# Patient Record
Sex: FEMALE | Race: WHITE | NOT HISPANIC OR LATINO | Employment: FULL TIME | URBAN - METROPOLITAN AREA
[De-identification: names, ages, dates, MRNs, and addresses within clinical notes are randomized per-mention and may not be internally consistent; named-entity substitution may affect disease eponyms.]

---

## 2023-10-06 PROCEDURE — 1100000001 HC PRIVATE ROOM DAILY

## 2023-10-07 PROCEDURE — 1100000001 HC PRIVATE ROOM DAILY

## 2023-10-08 PROCEDURE — 1100000001 HC PRIVATE ROOM DAILY

## 2023-10-09 ENCOUNTER — HOSPITAL ENCOUNTER (INPATIENT)
Dept: NEUROLOGY | Facility: HOSPITAL | Age: 28
LOS: 5 days | Discharge: HOME | DRG: 101 | End: 2023-10-11
Attending: STUDENT IN AN ORGANIZED HEALTH CARE EDUCATION/TRAINING PROGRAM | Admitting: STUDENT IN AN ORGANIZED HEALTH CARE EDUCATION/TRAINING PROGRAM
Payer: COMMERCIAL

## 2023-10-09 DIAGNOSIS — R56.9 SEIZURE (MULTI): ICD-10-CM

## 2023-10-09 DIAGNOSIS — R56.9 CONVULSIONS, UNSPECIFIED CONVULSION TYPE (MULTI): Primary | ICD-10-CM

## 2023-10-09 PROCEDURE — 95700 EEG CONT REC W/VID EEG TECH: CPT

## 2023-10-09 PROCEDURE — 93010 ELECTROCARDIOGRAM REPORT: CPT | Performed by: INTERNAL MEDICINE

## 2023-10-09 PROCEDURE — 95716 VEEG EA 12-26HR CONT MNTR: CPT

## 2023-10-09 PROCEDURE — 1100000001 HC PRIVATE ROOM DAILY

## 2023-10-09 PROCEDURE — 95720 EEG PHY/QHP EA INCR W/VEEG: CPT | Performed by: STUDENT IN AN ORGANIZED HEALTH CARE EDUCATION/TRAINING PROGRAM

## 2023-10-09 RX ORDER — ACETAMINOPHEN 325 MG/1
650 TABLET ORAL EVERY 6 HOURS PRN
Status: DISCONTINUED | OUTPATIENT
Start: 2023-10-09 | End: 2023-10-11 | Stop reason: HOSPADM

## 2023-10-09 RX ORDER — LORAZEPAM 2 MG/ML
2 INJECTION INTRAMUSCULAR EVERY 5 MIN PRN
Status: DISCONTINUED | OUTPATIENT
Start: 2023-10-09 | End: 2023-10-11 | Stop reason: HOSPADM

## 2023-10-09 RX ORDER — DIPHENHYDRAMINE HCL 25 MG
25 CAPSULE ORAL EVERY 8 HOURS PRN
Status: DISCONTINUED | OUTPATIENT
Start: 2023-10-09 | End: 2023-10-11 | Stop reason: HOSPADM

## 2023-10-09 SDOH — SOCIAL STABILITY: SOCIAL INSECURITY: DO YOU FEEL UNSAFE GOING BACK TO THE PLACE WHERE YOU ARE LIVING?: NO

## 2023-10-09 SDOH — SOCIAL STABILITY: SOCIAL INSECURITY: WERE YOU ABLE TO COMPLETE ALL THE BEHAVIORAL HEALTH SCREENINGS?: YES

## 2023-10-09 SDOH — SOCIAL STABILITY: SOCIAL INSECURITY: ABUSE: ADULT

## 2023-10-09 SDOH — SOCIAL STABILITY: SOCIAL INSECURITY: ARE YOU OR HAVE YOU BEEN THREATENED OR ABUSED PHYSICALLY, EMOTIONALLY, OR SEXUALLY BY ANYONE?: NO

## 2023-10-09 SDOH — ECONOMIC STABILITY: INCOME INSECURITY: IN THE LAST 12 MONTHS, WAS THERE A TIME WHEN YOU WERE NOT ABLE TO PAY THE MORTGAGE OR RENT ON TIME?: NO

## 2023-10-09 SDOH — ECONOMIC STABILITY: INCOME INSECURITY: HOW HARD IS IT FOR YOU TO PAY FOR THE VERY BASICS LIKE FOOD, HOUSING, MEDICAL CARE, AND HEATING?: NOT HARD AT ALL

## 2023-10-09 SDOH — ECONOMIC STABILITY: HOUSING INSECURITY
IN THE LAST 12 MONTHS, WAS THERE A TIME WHEN YOU DID NOT HAVE A STEADY PLACE TO SLEEP OR SLEPT IN A SHELTER (INCLUDING NOW)?: NO

## 2023-10-09 SDOH — ECONOMIC STABILITY: TRANSPORTATION INSECURITY
IN THE PAST 12 MONTHS, HAS LACK OF TRANSPORTATION KEPT YOU FROM MEETINGS, WORK, OR FROM GETTING THINGS NEEDED FOR DAILY LIVING?: NO

## 2023-10-09 SDOH — SOCIAL STABILITY: SOCIAL INSECURITY: ARE THERE ANY APPARENT SIGNS OF INJURIES/BEHAVIORS THAT COULD BE RELATED TO ABUSE/NEGLECT?: NO

## 2023-10-09 SDOH — SOCIAL STABILITY: SOCIAL INSECURITY: HAS ANYONE EVER THREATENED TO HURT YOUR FAMILY OR YOUR PETS?: NO

## 2023-10-09 SDOH — ECONOMIC STABILITY: TRANSPORTATION INSECURITY
IN THE PAST 12 MONTHS, HAS THE LACK OF TRANSPORTATION KEPT YOU FROM MEDICAL APPOINTMENTS OR FROM GETTING MEDICATIONS?: NO

## 2023-10-09 SDOH — ECONOMIC STABILITY: HOUSING INSECURITY: IN THE LAST 12 MONTHS, HOW MANY PLACES HAVE YOU LIVED?: 1

## 2023-10-09 SDOH — SOCIAL STABILITY: SOCIAL INSECURITY: DOES ANYONE TRY TO KEEP YOU FROM HAVING/CONTACTING OTHER FRIENDS OR DOING THINGS OUTSIDE YOUR HOME?: NO

## 2023-10-09 SDOH — SOCIAL STABILITY: SOCIAL INSECURITY: DO YOU FEEL ANYONE HAS EXPLOITED OR TAKEN ADVANTAGE OF YOU FINANCIALLY OR OF YOUR PERSONAL PROPERTY?: NO

## 2023-10-09 SDOH — SOCIAL STABILITY: SOCIAL INSECURITY: HAVE YOU HAD THOUGHTS OF HARMING ANYONE ELSE?: NO

## 2023-10-09 ASSESSMENT — ACTIVITIES OF DAILY LIVING (ADL)
DRESSING YOURSELF: INDEPENDENT
WALKS IN HOME: INDEPENDENT
ADEQUATE_TO_COMPLETE_ADL: YES
JUDGMENT_ADEQUATE_SAFELY_COMPLETE_DAILY_ACTIVITIES: YES
HEARING - RIGHT EAR: FUNCTIONAL
FEEDING YOURSELF: INDEPENDENT
TOILETING: INDEPENDENT
GROOMING: INDEPENDENT
BATHING: INDEPENDENT
HEARING - LEFT EAR: FUNCTIONAL
PATIENT'S MEMORY ADEQUATE TO SAFELY COMPLETE DAILY ACTIVITIES?: YES

## 2023-10-09 ASSESSMENT — COGNITIVE AND FUNCTIONAL STATUS - GENERAL
MOBILITY SCORE: 24
PATIENT BASELINE BEDBOUND: NO
DAILY ACTIVITIY SCORE: 24

## 2023-10-09 ASSESSMENT — PATIENT HEALTH QUESTIONNAIRE - PHQ9
2. FEELING DOWN, DEPRESSED OR HOPELESS: NOT AT ALL
1. LITTLE INTEREST OR PLEASURE IN DOING THINGS: NOT AT ALL
SUM OF ALL RESPONSES TO PHQ9 QUESTIONS 1 & 2: 0

## 2023-10-09 ASSESSMENT — PAIN SCALES - GENERAL
PAINLEVEL_OUTOF10: 0 - NO PAIN
PAINLEVEL_OUTOF10: 0 - NO PAIN

## 2023-10-09 ASSESSMENT — LIFESTYLE VARIABLES
HOW OFTEN DO YOU HAVE 6 OR MORE DRINKS ON ONE OCCASION: NEVER
HOW MANY STANDARD DRINKS CONTAINING ALCOHOL DO YOU HAVE ON A TYPICAL DAY: PATIENT DOES NOT DRINK
HOW OFTEN DO YOU HAVE A DRINK CONTAINING ALCOHOL: NEVER
AUDIT-C TOTAL SCORE: 0
SKIP TO QUESTIONS 9-10: 1
AUDIT-C TOTAL SCORE: 0

## 2023-10-09 ASSESSMENT — PAIN - FUNCTIONAL ASSESSMENT
PAIN_FUNCTIONAL_ASSESSMENT: 0-10
PAIN_FUNCTIONAL_ASSESSMENT: 0-10

## 2023-10-09 NOTE — H&P
"History Of Present Illness  Kaela Martinez is a 28 y.o. female presenting for EMU stay given recent c/f for seizure-like event.     Pert recent Hx:   7/10/23 presented to [facility] with “shaking activity” after working overnight then going to breakfast with mother and then to a hair appt were she laid her head back and fell asleep. On waking she was told by the stylist that she had been seen “shaking and looked blue”. Witnesses described that she let out a cry while in her chair, turned purple, eyes looking up toward the ceiling, arched her back and started rhythmically shaking her arms for approximately 15 minutes. Sometime in that span, people around her said she started responding to their questions, but she does not recall this. She does not recall this incident, does not remember feeling faint or losing consciousness. The next thing she remembers is \"coming to\" on the stretcher in the back of the ambulance, but says she felt very foggy and not back to baseline. Denies loss of bowel or bladder, denies injuries or biting her tongue. No recent illness,   No, HA, visual changes, vomiting, slurred speech or FND. Afterward she was reported to be sleepy for 10 minutes. ED note reported overall vital stability with exception of tachycardia (128), a normal neurological exam by ED provider. Lab work at that time was overall normal w/e/o mild lymphocyte-predominant leukocytosis  (11.4), EKG sinus tach (121). CTH normal.   ED provider impression: syncope vs seizure related to exhaustion    Seen in epilepsy clinic 8/10/23 by Dr. Vickers  At that appt: no additional episodes reported, pt was not driving, MRI normal, rEEG reported generalized intermittent slowing. Recommendation for 1 day stay in EMU and not driving was made.       SH: NICU nurse, no tobacco, no drugs, occasional etoh  FH: no hx of seizures/neurological conditions    Meds: None    Seizures Risk Factors:  Birth History: normal birth, development, no infections " "or febrile seizures.   No hx of psychiatric conditions or psychiatric trauma.     Past Medical History  History reviewed. No pertinent past medical history.  Surgical History  History reviewed. No pertinent surgical history.  Social History  Social History     Tobacco Use    Smoking status: Never     Passive exposure: Never    Smokeless tobacco: Never   Vaping Use    Vaping Use: Never used   Substance Use Topics    Alcohol use: Not Currently    Drug use: Never     Allergies  Patient has no known allergies.  No medications prior to admission.     Last Recorded Vitals  Blood pressure 121/79, pulse 67, temperature 35.8 °C (96.4 °F), temperature source Temporal, resp. rate 20, height 1.575 m (5' 2.01\"), weight 63.5 kg (140 lb), SpO2 97 %.            Neurological Exam:  MENTAL STATUS:  General appearance:  Orientation:  Language: Expression, repetition, naming, comprehension intact  Follows complex commands across midline  Thought processes: Logical, organized  Memory: 3/3 registration, 3/3 delayed recall  Calculation: Able to count and add  Concentration: Intact  Fund of knowledge: Appropriate  Judgment: Intact  Insight: Intact    CRANIAL NERVES:  - Fundoscopic exam: Fundi poorly visualized bilaterally 2/2 cooperation  - II:  Visual fields intact to confrontation bilaterally tested individually and together  - III, IV, VI: ROZ, EOMI to pursuit without nystagmus  - V: V1-V3 sensation intact bilaterally  - VII: Face muscles symmetric with smile and eye closure  - VIII: Intact to finger rub bilaterally  - IX, X: Palate elevated symmetrically bilaterally, no hoarseness  - XI: 5/5 strength on shoulder shrugging bilaterally  - XII: Tongue midline without atrophy or fasciculation    MOTOR: Tone and bulk normal in all extremities    STRENGTH: R L  Deltoid  5 5  Biceps  5 5  Triceps  5 5    5 5  Thumb Abd 5 5  Finger Abd 5 5  Neck Flex 5          5  Neck Ext 5 5  Hip abduction 5 5  Hip adduction 5 5  Hip " flexion 5 5  Quadriceps 5 5  Hamstrings 5 5  DorsiFlex 5          5  PlantarFlex 5 5    REFLEXES:  R  L  Biceps   2+ 2+  Triceps   2+ 2+  Brachioradialis  2+ 2+  Patellar   2+ 2+  Achilles  2+ 2+  Plantar  Down Down    COORDINATION: Intact on finger to nose bl    SENSORY: Intact to light touch in bl UE and LE    PROPRIOCEPTION:  Intact in toes and fingers bilaterally     I have personally reviewed the following imaging results No results found.    Classification of the Paroxysmal Episodes:  Epileptic  Episodes semiology: Unclear (generalized shaking)  Frequency: once  History of Status Epilepticus:  No  Localization: Unknown  Etiology: unknown   Co-morbidities: none         Assessment/Plan   Ms. Kaela Martinez is a 28 y.o. female presenting for EMU stay given recent c/f for seizure-like event. Pt appears to have had one episode of generalized shaking in the setting of sleep deprivation, c/f a generalized seizure.     #C/f seizure   -Admit to EMU for 24hrs.  -sleep deprive  -no meds to hold.    Sami Zhong, DO  PGY-4 Epilepsy Team Sr.

## 2023-10-09 NOTE — CARE PLAN
The patient's goals for the shift include patient will remain safe during this shift    The clinical goals for the shift include patient will have an event captured on VEEG for characterization .    Over the shift, the patient did not make progress toward the following goals. Barriers to progression include no event this shift. Recommendations to address these barriers include , continue to monitor.

## 2023-10-10 PROCEDURE — 1100000001 HC PRIVATE ROOM DAILY

## 2023-10-10 PROCEDURE — 2500000001 HC RX 250 WO HCPCS SELF ADMINISTERED DRUGS (ALT 637 FOR MEDICARE OP): Performed by: STUDENT IN AN ORGANIZED HEALTH CARE EDUCATION/TRAINING PROGRAM

## 2023-10-10 PROCEDURE — 95716 VEEG EA 12-26HR CONT MNTR: CPT

## 2023-10-10 PROCEDURE — 95700 EEG CONT REC W/VID EEG TECH: CPT

## 2023-10-10 PROCEDURE — 95720 EEG PHY/QHP EA INCR W/VEEG: CPT | Performed by: STUDENT IN AN ORGANIZED HEALTH CARE EDUCATION/TRAINING PROGRAM

## 2023-10-10 PROCEDURE — 99223 1ST HOSP IP/OBS HIGH 75: CPT | Performed by: STUDENT IN AN ORGANIZED HEALTH CARE EDUCATION/TRAINING PROGRAM

## 2023-10-10 PROCEDURE — 2500000004 HC RX 250 GENERAL PHARMACY W/ HCPCS (ALT 636 FOR OP/ED): Performed by: STUDENT IN AN ORGANIZED HEALTH CARE EDUCATION/TRAINING PROGRAM

## 2023-10-10 RX ORDER — LEVETIRACETAM 500 MG/1
750 TABLET ORAL 2 TIMES DAILY
Status: DISCONTINUED | OUTPATIENT
Start: 2023-10-10 | End: 2023-10-11 | Stop reason: HOSPADM

## 2023-10-10 RX ORDER — LEVETIRACETAM 10 MG/ML
1000 INJECTION INTRAVASCULAR ONCE
Status: DISCONTINUED | OUTPATIENT
Start: 2023-10-10 | End: 2023-10-10

## 2023-10-10 RX ORDER — LEVETIRACETAM 10 MG/ML
1000 INJECTION INTRAVASCULAR ONCE
Status: COMPLETED | OUTPATIENT
Start: 2023-10-10 | End: 2023-10-10

## 2023-10-10 RX ADMIN — LEVETIRACETAM 1000 MG: 10 INJECTION INTRAVENOUS at 13:18

## 2023-10-10 RX ADMIN — LEVETIRACETAM 750 MG: 750 TABLET, FILM COATED ORAL at 21:00

## 2023-10-10 ASSESSMENT — COGNITIVE AND FUNCTIONAL STATUS - GENERAL
MOBILITY SCORE: 24
MOBILITY SCORE: 24
DAILY ACTIVITIY SCORE: 24
DAILY ACTIVITIY SCORE: 24
MOBILITY SCORE: 24
DAILY ACTIVITIY SCORE: 24

## 2023-10-10 ASSESSMENT — PAIN - FUNCTIONAL ASSESSMENT
PAIN_FUNCTIONAL_ASSESSMENT: 0-10
PAIN_FUNCTIONAL_ASSESSMENT: 0-10

## 2023-10-10 ASSESSMENT — PAIN SCALES - GENERAL
PAINLEVEL_OUTOF10: 0 - NO PAIN
PAINLEVEL_OUTOF10: 0 - NO PAIN

## 2023-10-10 ASSESSMENT — ACTIVITIES OF DAILY LIVING (ADL): LACK_OF_TRANSPORTATION: NO

## 2023-10-10 NOTE — CARE PLAN
The patient's goals for the shift include patient will remain safe during this shift    The clinical goals for the shift include patient will have an event for characterization    Over the shift, the patient did not make progress toward the following goals. Barriers to progression include no clinical seizure . Recommendations to address these barriers include continue to monitor .

## 2023-10-10 NOTE — PROGRESS NOTES
10/10/23 1041   Discharge Planning   Living Arrangements Spouse/significant other   Support Systems Spouse/significant other;Parent   Assistance Needed None. Pt works a nurse in the NICU   Type of Residence Private residence   Who is requesting discharge planning? Provider   Home or Post Acute Services None   Patient expects to be discharged to: home with significant other   Does the patient need discharge transport arranged? No  (Pts mother or boyfriend will provide transport home.)   Financial Resource Strain   How hard is it for you to pay for the very basics like food, housing, medical care, and heating? Not hard   Housing Stability   In the last 12 months, was there a time when you were not able to pay the mortgage or rent on time? N   In the last 12 months, was there a time when you did not have a steady place to sleep or slept in a shelter (including now)? N   Transportation Needs   In the past 12 months, has lack of transportation kept you from medical appointments or from getting medications? no   In the past 12 months, has lack of transportation kept you from meetings, work, or from getting things needed for daily living? No       Transitional Care Coordination Progress Note:  Patient discussed during interdisciplinary rounds.   Team members present: MD MARITA  Plan per Medical/Surgical team:Pt admitted with convulsions, placed on EEG monitor, possible discharge today.  Payor source:  Employee Medical  Discharge disposition: home with significant other  Potential Barriers: none  ADOD: 10/10  MD Segovia made aware the per pt she needs assistance with completing her FMLA paperwork, stated the deadline to have it complete is Friday. MD agreeable to assist.   Cher Eugene RN Transitional Care Coordinator

## 2023-10-10 NOTE — PROGRESS NOTES
"Subjective :   Patient is doing well this am , no event past 24 hours     All Active Medications:   Current Facility-Administered Medications   Medication Dose Route Frequency Provider Last Rate Last Admin    acetaminophen (Tylenol) tablet 650 mg  650 mg oral q6h PRN Juan Mancia MD        diphenhydrAMINE (BENADryl) capsule 25 mg  25 mg oral q8h PRN Juan Mancia MD        levETIRAcetam (Keppra) tablet 750 mg  750 mg oral BID Juan Mancia MD        LORazepam (Ativan) injection 2 mg  2 mg intravenous q5 min PRN Sami Zhong DO        oxygen (O2) therapy   inhalation Continuous PRN - O2/gases Sami Zhong, DO             PHYSICAL EXAMINATION:     Vital Signs:   /77   Pulse 70   Temp 36.5 °C (97.7 °F)   Resp 20   Ht 1.575 m (5' 2.01\")   Wt 63.5 kg (140 lb)   LMP  (LMP Unknown)   SpO2 98%   BMI 25.60 kg/m²       General Examination:   Constitutional: Pt is very pleasant, well nourished, well developed. Eyes: See under neurological examination. Musculoskeletal and extremities: See under neurological examination. Psychiatric: Patient is cooperative and friendly, keeps good eye contact, thought content and form are normal.    Neurological Examination:   Mental Status: Pt is awake, alert and oriented to time, place and person. Patient has normal speech, language, good fund of knowledge and intact recent and remote memory.     Cranial Nerves: (2nd) Patients visual fields are full to finger confrontation. (3rd, 4th, 6th) Patients pupils are equal round and reactive to light bilaterally. External ocular muscles are intact with conjugate eye movements. No nystagmus detected. (5th) Facial sensation to both light touch and pin prick are intact. (7th) Face is symmetric and facial movements are strong. (8th) hearing is intact to finger rub bilaterally. (11th) Shoulder shrug is intact bilaterally.     Motor examination: Muscle tone is normal globally. No focal atrophy is present. No fasciculation is detected. There " is no pronator drift or orbiting.   Strength of upper extremities;Patient has 5/5 strength in both UEs and LEs proximally and distally    Deep tendon reflexes: Patient has normal symmetric reflexes in all 4 extremities    Sensory examination: Patient has normal light touch sensation over all extremities.     Coordination examination: Rapid alternating movements are performed at good rate and rhythm. Finger to nose test with eyes open and closed performed normally and bilaterally.     Gait examination: Patient has normal gait with normal arm swing. Tandem gait is normal.     Lab Results   Component Value Date    WBC 11.4 (H) 07/10/2023    HGB 12.6 07/10/2023    HCT 38.3 07/10/2023    MCV 90.8 07/10/2023     07/10/2023       Lab Results   Component Value Date     07/10/2023    K 4.2 07/10/2023    CL 99 07/10/2023    CO2 22 (L) 07/10/2023       Lab Results   Component Value Date    CREATININE 0.7 07/10/2023    BUN 7 (L) 07/10/2023     07/10/2023    K 4.2 07/10/2023    CL 99 07/10/2023    CO2 22 (L) 07/10/2023       Lab Results   Component Value Date    ALT 14 07/10/2023    AST 16 07/10/2023    ALKPHOS 53 07/10/2023    BILITOT 0.7 07/10/2023         Classification of Paroxysmal Episodes        1.          Diagnosis: Epileptic Paroxysmal Episode          Epileptogenic Zone:            Seizure Type:               1.                   Epileptic Seizure Semiology: Generalized Tonic-Clonic Seizure                   Start From: 2023                   Frequency:            Lateralizing Sign:            Etiology: Unknown      ==================================================  Current Video/EEG  ==================================================      Intericatal EEG Findings:           Non-Epileptiform Abnormality:               1. EEG Finding      Impression and Plan      Evolution in last 24 hours: No clinical episode last 24hrs       Subjective: Patient is doing well.       Objective:       Current non-AED  Rx:       Impression: Ms. Kaela Martinez is a 28 y.o. female presenting for EMU stay given recent c/f for seizure-like event. Pt appears to have had one episode of generalized shaking in the setting of sleep deprivation, c/f a generalized seizure. vEEG showed intermittent slowing with some sharply contoured waves. Discussion with the patient given her semiology that is very convincing for epileptic event , its reasonable to start keppra 750 mg BID. Patient requires another day of admission given her risk for seizures  again with sleep deprivation and need to check if any improvement in the EEG after starting anti-epileptics. 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 Give 1g keppra once IV then 750 mg BID           2. Continue VEEG           3. Patient needs to be monitored for another day to look for any improvement in the EEG after starting medication          4. Ativan 2 mg if seizure greater than 3 minutes           5. Seizure and fall precautions           6. Neurology follow up on discharge tomorrow       Juan Mancia MD   Epilepsy Fellow   [facility]

## 2023-10-10 NOTE — CARE PLAN
The patient's goals for the shift include stay awake until 0500.    The clinical goals for the shift include Pt will tolerate sleep deprivation    Over the shift, the patient made progress toward the following goals.

## 2023-10-11 ENCOUNTER — PHARMACY VISIT (OUTPATIENT)
Dept: PHARMACY | Facility: CLINIC | Age: 28
End: 2023-10-11
Payer: COMMERCIAL

## 2023-10-11 VITALS
DIASTOLIC BLOOD PRESSURE: 82 MMHG | SYSTOLIC BLOOD PRESSURE: 126 MMHG | HEIGHT: 62 IN | WEIGHT: 140 LBS | HEART RATE: 60 BPM | RESPIRATION RATE: 20 BRPM | TEMPERATURE: 97.5 F | BODY MASS INDEX: 25.76 KG/M2 | OXYGEN SATURATION: 97 %

## 2023-10-11 PROBLEM — R01.1 CARDIAC MURMUR, UNSPECIFIED: Status: ACTIVE | Noted: 2023-10-11

## 2023-10-11 PROBLEM — N63.20 BREAST MASS, LEFT: Status: ACTIVE | Noted: 2023-10-11

## 2023-10-11 PROBLEM — D48.9 NEOPLASM OF UNCERTAIN BEHAVIOR: Status: ACTIVE | Noted: 2023-10-11

## 2023-10-11 PROCEDURE — 99238 HOSP IP/OBS DSCHRG MGMT 30/<: CPT | Performed by: STUDENT IN AN ORGANIZED HEALTH CARE EDUCATION/TRAINING PROGRAM

## 2023-10-11 PROCEDURE — RXMED WILLOW AMBULATORY MEDICATION CHARGE

## 2023-10-11 PROCEDURE — 2500000001 HC RX 250 WO HCPCS SELF ADMINISTERED DRUGS (ALT 637 FOR MEDICARE OP): Performed by: STUDENT IN AN ORGANIZED HEALTH CARE EDUCATION/TRAINING PROGRAM

## 2023-10-11 PROCEDURE — 95720 EEG PHY/QHP EA INCR W/VEEG: CPT | Performed by: STUDENT IN AN ORGANIZED HEALTH CARE EDUCATION/TRAINING PROGRAM

## 2023-10-11 PROCEDURE — 95716 VEEG EA 12-26HR CONT MNTR: CPT

## 2023-10-11 RX ORDER — ONDANSETRON 4 MG/1
4 TABLET, FILM COATED ORAL EVERY 8 HOURS
COMMUNITY
Start: 2023-06-16

## 2023-10-11 RX ORDER — NORETHINDRONE ACETATE AND ETHINYL ESTRADIOL 1.5-30(21)
1 KIT ORAL DAILY
COMMUNITY
Start: 2020-01-07

## 2023-10-11 RX ORDER — NORETHINDRONE ACETATE AND ETHINYL ESTRADIOL 1MG-20(21)
1 KIT ORAL DAILY
COMMUNITY
Start: 2014-11-10

## 2023-10-11 RX ORDER — LEVETIRACETAM 750 MG/1
750 TABLET ORAL 2 TIMES DAILY
Qty: 120 TABLET | Refills: 2 | Status: SHIPPED | OUTPATIENT
Start: 2023-10-11 | End: 2024-10-10

## 2023-10-11 RX ADMIN — LEVETIRACETAM 750 MG: 750 TABLET, FILM COATED ORAL at 09:24

## 2023-10-11 ASSESSMENT — PAIN SCALES - GENERAL: PAINLEVEL_OUTOF10: 0 - NO PAIN

## 2023-10-11 ASSESSMENT — PAIN - FUNCTIONAL ASSESSMENT: PAIN_FUNCTIONAL_ASSESSMENT: 0-10

## 2023-10-11 NOTE — CARE PLAN
The clinical goals for the shift include not to fall during shift, remain safe during shift, have stated comfort during shift, and to tolerate VEEG monitoring to capture events for clinical correlation and medication management.    Pt without events this shift.

## 2023-10-11 NOTE — CARE PLAN
The patient's goals for the shift include patient will remain safe during this shift    The clinical goals for the shift include Patient will have a good response to new medical regimen for seizure    Over the shift, the patient did not make progress toward the following goals. Barriers to progression include no clinical sz noted. Recommendations to address these barriers include maintain seizure precautions.

## 2023-10-11 NOTE — DISCHARGE SUMMARY
Discharge Diagnosis  Convulsions, unspecified convulsion type (CMS/[facility])    Epilepsy Quality and Core Measure Topics  The patient was encouraged to keep a seizure diary  The patient was encouraged to practice good sleep hygiene  The risks and benefits of pertinent medications were discussed with the patient and/or family  Addressed all relevant psychiatric comorbidities  First Time Seizures  No this is not the patient's first seizure  Is this patient seizure free?  No, the following changes will be made to reduce seizure frequency: Started LEV 750mg BID  Should this patient observe standard seizure precautions?  Yes Reviewed seizure precautions with patient; specifically, the patient may not drive, may not operate heavy machinery, ought not swim unsupervised, should shower rather than bath, should be cautious with hot or heavy objects, and should not perform any activities at heights such as on a ladder. This will be re-assessed at the patient's next appointment.   Medication Side Effects Discussion Statement  The risks and benefits of pertinent medications were discussed with the patient and/or kin.  Women with Epilepsy Discussion  Discussion for a female patient with epilepsy of childbearing age consisted of: To please inform us if you plan to get pregnant so that we can review your antiepileptic options and monitor you more frequently., To please inform us if contraception is changed or discontinued., and that keppra is tolerated well and unlikely to affect any future pregnancy, however that she should notify her epileptologist if she plans to or becomes pregnant for more close monitoring.    Issues Requiring Follow-Up  Potential clearance for driving (last sz July 2023)    Test Results Pending At Discharge  Pending Labs       No current pending labs.            Hospital Course           Diagnosis: Epileptic Paroxysmal Episode          Epileptogenic Zone:  unknown          Seizure Type:               1. Epileptic  Seizure Semiology: Generalized Tonic-Clonic Seizure                   Start From: 2023                   Frequency:  one time          Lateralizing Sign:            Etiology: Unknown       ==================================================  Current Video/EEG  ==================================================      Intericatal EEG Findings:           Non-Epileptiform Abnormality:               1. EEG Finding       Brief summary:   Admitted 10/9/23. EEG finding nonspecific and abolished during sleep, but demonstrated generalized bursts of sharply contoured intermittent slow - unclear if this is a normal variant or related to episode. Given her high risk job and semiology concerning for epilepsy, shared decision-making prompted initiation [id number] BID during EMU admission 10/10/23. Discharged home with 750mg BID keppra, to maintain seizure precautions at least until followup (6months would be January).    Pertinent Physical Exam At Time of Discharge  Physical Exam  Vitals and nursing note reviewed.   Constitutional:       Appearance: Normal appearance.   HENT:      Head: Normocephalic and atraumatic.   Cardiovascular:      Rate and Rhythm: Normal rate and regular rhythm.   Neurological:      General: No focal deficit present.      Mental Status: She is alert.      Cranial Nerves: Cranial nerves 2-12 are intact.      Sensory: Sensation is intact.      Motor: Motor function is intact.      Coordination: Coordination is intact.      Gait: Gait is intact.      Deep Tendon Reflexes: Reflexes are normal and symmetric.         Home Medications     Medication List      START taking these medications     levETIRAcetam 750 mg tablet; Commonly known as: Keppra; Take 1 tablet   (750 mg) by mouth 2 times a day.       Outpatient Follow-Up  Future Appointments   Date Time Provider Department Center   10/12/2023 10:00 AM Zahra Vickers MD [id number]       Sukhjinder Segovia MD

## 2023-10-12 ENCOUNTER — OFFICE VISIT (OUTPATIENT)
Dept: NEUROLOGY | Facility: HOSPITAL | Age: 28
End: 2023-10-12
Payer: COMMERCIAL

## 2023-10-12 VITALS
HEIGHT: 62 IN | WEIGHT: 140 LBS | TEMPERATURE: 97.8 F | BODY MASS INDEX: 25.76 KG/M2 | SYSTOLIC BLOOD PRESSURE: 130 MMHG | HEART RATE: 91 BPM | DIASTOLIC BLOOD PRESSURE: 87 MMHG | RESPIRATION RATE: 18 BRPM

## 2023-10-12 DIAGNOSIS — G40.909 SEIZURE DISORDER (MULTI): Primary | ICD-10-CM

## 2023-10-12 PROCEDURE — 1036F TOBACCO NON-USER: CPT | Performed by: PSYCHIATRY & NEUROLOGY

## 2023-10-12 PROCEDURE — 99214 OFFICE O/P EST MOD 30 MIN: CPT | Mod: GC | Performed by: PSYCHIATRY & NEUROLOGY

## 2023-10-12 PROCEDURE — 99214 OFFICE O/P EST MOD 30 MIN: CPT | Performed by: PSYCHIATRY & NEUROLOGY

## 2023-10-12 ASSESSMENT — PAIN SCALES - GENERAL: PAINLEVEL: 0-NO PAIN

## 2023-10-12 ASSESSMENT — ENCOUNTER SYMPTOMS
OCCASIONAL FEELINGS OF UNSTEADINESS: 0
DEPRESSION: 0
LOSS OF SENSATION IN FEET: 0

## 2023-10-12 NOTE — PATIENT INSTRUCTIONS
Dear Ms. Kyle,     You came in here with follow up for seizures. Your EEG shows some abnormalities that could make you more likely to have seizures again. Please continue taking your Keppra 750mg BID.    Do not to drive (for 6 months since last seizure episode), use power tools or operate heavy machinery, and should not be on ladders. Use the shower and not the bath. Please avoid sleep deprivation, including limiting night time shifts, or work shifts more than 16 hours as it could put you at risk of repeated seizures.  Likewise, refrain from any activity which could result in injury to themselves or others if they had a seizure or lost consciousness. These restrictions should continue until instructed by a doctor to do otherwise.     Wishing you a speedy recovery,    Neurology

## 2023-10-12 NOTE — PROGRESS NOTES
Chief Complaint: FU seizure     History of Present Illness  Ms. Kaela Martinez is a 27 year old patient w/ no past medical history presenting for follow-up appointment for unprovoked, first-time seizure (occurred 7/10).   Last seen on 8/10 by Dr. Lowery no additional episodes reported, pt was not driving, MRI normal, rEEG reported generalized intermittent slowing. Recommendation for 1 day stay in EMU and not driving was made.   Patient was admitted to the EMU from 10/9 to 10/11 for cEEG monitoring which demonstrated generalized bursts of sharply contoured intermittent slow - unclear if this is a normal variant or related to episode. Given her high risk job and semiology concerning for epilepsy, shared decision-making prompted initiation [id number] BID     Classification of the Paroxysmal Episodes:  Epileptic  Episodes semiology: Unclear (generalized shaking)  Frequency: once  History of Status Epilepticus:  No  Localization: Unknown  Etiology: unknown   Co-morbidities: none    Interval Hx:   No seizures since July event, tolerating Keppra well.     Prior Investigations:   Routine EEG 7/27/23  IMPRESSION:  This routine EEG shows generalized intermittent slow well beyond hyperventilation, and while the patient is fully awake. This may support a mild diffuse encephalopathy. Nevertheless it can be seen in patients with generalized epileptogenicity. A more prolonged sleep EEG may be useful to further investigate the degree of epileptogenicity. No epileptiform abnormalities or lateralizing signs were captured.      Continuous EEG 10/9   SH: NICU nurse, no tobacco, no drugs, occasional etoh  FH: no hx of seizures/neurological conditions       Seizures Risk Factors:  Birth History: normal birth, development, no infections or febrile seizures.   No hx of psychiatric conditions or psychiatric trauma.      Active Problems  Problems    · Convulsion (780.39) (R56.9)   · Seizure disorder (345.90) (G40.909)      Allergies  Bactrim      Current Meds     Medication Name Instruction   No Reported Medications        Vitals  Vital Signs     Recorded: [id number] 09:34AM   Heart Rate 70   Respiration 18   Systolic 125   Diastolic 88   Height 5 ft 2 in   Weight 140 lb    BMI Calculated 25.61 kg/m2   BSA Calculated 1.64   Tobacco Use b) No   PHQ-2  #1. Over the last 2 weeks have you felt down, depressed or hopeless?  (If yes, answer PHQ-9 below) No   PHQ-2  #2. Over the last 2 weeks have you felt little interest  or pleasure in doing things?  (If yes, answer PHQ-9 below) No   Falls Screening (Age 18+) a) No falls within the last year   Pain Scale 0      Physical Exam  MENTAL STATE: Orientation was normal to time, place and person. Recent and remote memory was intact. Attention span and concentration were normal. General fund of knowledge was intact.      LANGUAGE: Language testing was normal for comprehension and expression. No dysarthria.     CRANIAL NERVES:   CN 2 Visual fields full to confrontation.   CN 3, 4, 6 Pupils round, 4 mm in diameter, equally reactive to light. Lids symmetric; no ptosis. EOMs normal alignment, full range with normal saccades, pursuit and convergence. No nystagmus.   CN 5 Facial sensation intact bilaterally to light touch   CN 7 Normal and symmetric facial strength. Nasolabial folds symmetric.   CN 8 Hearing intact to conversation.  CN 9 Palate elevates symmetrically.   CN 11 Normal strength of shoulder shrug.  CN 12 Tongue midline, with normal bulk and strength; no fasciculations.      MOTOR: Muscle bulk was normal and tone was normal in both upper and lower extremities. No adventitious movements.      R L  Delt 5 5  Bicep 5 5  Tricep 5 5    5 5     Hip Flex 5 5  Leg Ext 5 5  Leg Flex 5 5  DF 5 5  PF 5 5    .      SENSORY: Sensory exam was normal. In both upper and lower extremities, sensation was intact to light touch.     COORDINATION: Coordination exam was normal. In both upper extremities,  finger-nose-finger was intact without dysmetria or overshoot.      GAIT: Gait was normal. Station was stable with a normal base. Gait was stable with a normal arm swing and speed. No ataxia, shuffling, steppage or waddling was present. No circumduction was present. Tandem gait was intact. No Romberg sign was present.      ROS: All systems reviewed, pertinent positives noted in HPI      Provider Impressions:  Ms. Martinez is a 27-year-old female with no past medical history presents for follow-up appointment for unprovoked, first-time seizure. Patient here after recent cEEG during EMU admission showing intermittent sharp spikes and slowing. Patient was started on 750mg Keppra due to her high risk job and semiology concerning for epilepsy. No concerns during this visit.        Plan:  - c/w 750mg Keppra BID  -Seizure precautions reviewed. Patient to continue not driving until seizure free for 6 months.  -Return to clinic 6 month     Renata Ortiz MD  Neurology Resident, PGY-2    Attestation:    I saw and evaluated the patient. I personally obtained the key and critical portions of the history and physical exam or was physically present for key and critical portions performed by the resident/fellow. I reviewed the resident/fellow's documentation and discussed the patient with the resident/fellow. I agree with the resident/fellow's medical decision making as documented in the note.  Zahra Vickers MD

## 2023-10-16 ENCOUNTER — PATIENT OUTREACH (OUTPATIENT)
Dept: CARE COORDINATION | Facility: CLINIC | Age: 28
End: 2023-10-16
Payer: COMMERCIAL

## 2023-10-16 NOTE — PROGRESS NOTES
Medications  Medications reviewed with patient/caregiver?: Yes (10/16/2023  2:33 PM)  Is the patient having any side effects they believe may be caused by any medication additions or changes?: No (a little dizzy from time to time, but basically OK) (10/16/2023  2:33 PM)  Does the patient have all medications ordered at discharge?: Yes (10/16/2023  2:33 PM)  Care Management Interventions: No intervention needed (10/16/2023  2:33 PM)  Is the patient taking all medications as directed (includes completed medication regime)?: Yes (10/16/2023  2:33 PM)    Appointments  Does the patient have a primary care provider?: Yes (follow up with neuro) (10/16/2023  2:33 PM)  Care Management Interventions: Verified appointment date/time/provider (10/16/2023  2:33 PM)  Has the patient kept scheduled appointments due by today?: Yes (10/16/2023  2:33 PM)    Patient Teaching  Does the patient have access to their discharge instructions?: Yes (10/16/2023  2:33 PM)  Care Management Interventions: Reviewed instructions with patient (10/16/2023  2:33 PM)  What is the patient's perception of their health status since discharge?: Returned to baseline/stable (10/16/2023  2:33 PM)    Wrap Up  Wrap Up Additional Comments: Lexii reports she's doing OK, taking the Keppra as ordered without side effects except for a little bit of dizziness.  Has returned to work, has transportation to an from work and to appointments.  Saw neurologist 10/12 (10/16/2023  2:33 PM)

## 2023-10-25 ENCOUNTER — HOSPITAL ENCOUNTER (OUTPATIENT)
Dept: CARDIOLOGY | Facility: HOSPITAL | Age: 28
Discharge: HOME | End: 2023-10-25
Payer: COMMERCIAL

## 2023-10-25 LAB
ATRIAL RATE: 63 BPM
P AXIS: 56 DEGREES
P OFFSET: 211 MS
P ONSET: 166 MS
PR INTERVAL: 108 MS
Q ONSET: 220 MS
QRS COUNT: 11 BEATS
QRS DURATION: 80 MS
QT INTERVAL: 426 MS
QTC CALCULATION(BAZETT): 435 MS
QTC FREDERICIA: 433 MS
R AXIS: 75 DEGREES
T AXIS: 69 DEGREES
T OFFSET: 433 MS
VENTRICULAR RATE: 63 BPM

## 2023-10-25 PROCEDURE — 93005 ELECTROCARDIOGRAM TRACING: CPT

## 2023-11-15 ENCOUNTER — PATIENT OUTREACH (OUTPATIENT)
Dept: CARE COORDINATION | Facility: CLINIC | Age: 28
End: 2023-11-15
Payer: COMMERCIAL

## 2023-11-15 NOTE — PROGRESS NOTES
Kaela returned call.  She is doing well, only concern is that her Keppra needs to be refilled.  She spoke with [facility] pharmacy who said that they need to speak with her provider.  Kaela will contact the pharmacy again today and, if needed will contact her neurologist to refill the Keppra.  If there are issues, she will call this CM to intervene

## 2023-11-22 ENCOUNTER — PHARMACY VISIT (OUTPATIENT)
Dept: PHARMACY | Facility: CLINIC | Age: 28
End: 2023-11-22
Payer: COMMERCIAL

## 2023-11-22 PROCEDURE — RXMED WILLOW AMBULATORY MEDICATION CHARGE

## 2024-01-30 ENCOUNTER — PHARMACY VISIT (OUTPATIENT)
Dept: PHARMACY | Facility: CLINIC | Age: 29
End: 2024-01-30

## 2024-01-30 PROCEDURE — RXMED WILLOW AMBULATORY MEDICATION CHARGE

## 2024-04-12 ENCOUNTER — APPOINTMENT (OUTPATIENT)
Dept: NEUROLOGY | Facility: HOSPITAL | Age: 29
End: 2024-04-12
Payer: COMMERCIAL